# Patient Record
Sex: FEMALE | Race: BLACK OR AFRICAN AMERICAN | NOT HISPANIC OR LATINO | Employment: FULL TIME | URBAN - METROPOLITAN AREA
[De-identification: names, ages, dates, MRNs, and addresses within clinical notes are randomized per-mention and may not be internally consistent; named-entity substitution may affect disease eponyms.]

---

## 2021-01-30 ENCOUNTER — OCCMED (OUTPATIENT)
Dept: URGENT CARE | Facility: CLINIC | Age: 41
End: 2021-01-30

## 2021-01-30 DIAGNOSIS — Z02.1 PRE-EMPLOYMENT HEALTH SCREENING EXAMINATION: Primary | ICD-10-CM

## 2021-01-30 PROCEDURE — 86765 RUBEOLA ANTIBODY: CPT

## 2021-01-30 PROCEDURE — 86480 TB TEST CELL IMMUN MEASURE: CPT

## 2021-01-30 PROCEDURE — 86735 MUMPS ANTIBODY: CPT

## 2021-01-30 PROCEDURE — 86787 VARICELLA-ZOSTER ANTIBODY: CPT

## 2021-01-30 PROCEDURE — 86762 RUBELLA ANTIBODY: CPT

## 2021-01-31 LAB — RUBV IGG SERPL IA-ACNC: 137.4 IU/ML

## 2021-02-02 LAB — MEV IGG SER QL: NORMAL

## 2021-02-03 LAB
MUV IGG SER QL: NORMAL
VZV IGG SER IA-ACNC: NORMAL

## 2021-02-04 LAB
GAMMA INTERFERON BACKGROUND BLD IA-ACNC: 0.03 IU/ML
M TB IFN-G BLD-IMP: NEGATIVE
M TB IFN-G CD4+ BCKGRND COR BLD-ACNC: -0.01 IU/ML
M TB IFN-G CD4+ BCKGRND COR BLD-ACNC: 0 IU/ML
MITOGEN IGNF BCKGRD COR BLD-ACNC: 5.6 IU/ML

## 2021-11-04 ENCOUNTER — APPOINTMENT (OUTPATIENT)
Dept: URGENT CARE | Facility: CLINIC | Age: 41
End: 2021-11-04

## 2021-11-04 DIAGNOSIS — Z02.1 PRE-EMPLOYMENT HEALTH SCREENING EXAMINATION: Primary | ICD-10-CM

## 2021-11-04 PROCEDURE — 86480 TB TEST CELL IMMUN MEASURE: CPT

## 2021-11-08 LAB
GAMMA INTERFERON BACKGROUND BLD IA-ACNC: 0.03 IU/ML
M TB IFN-G BLD-IMP: NEGATIVE
M TB IFN-G CD4+ BCKGRND COR BLD-ACNC: 0.01 IU/ML
M TB IFN-G CD4+ BCKGRND COR BLD-ACNC: 0.01 IU/ML
MITOGEN IGNF BCKGRD COR BLD-ACNC: >10 IU/ML

## 2023-03-14 ENCOUNTER — TELEPHONE (OUTPATIENT)
Dept: OBGYN CLINIC | Facility: HOSPITAL | Age: 43
End: 2023-03-14

## 2023-03-14 NOTE — TELEPHONE ENCOUNTER
Caller: Patient    Doctor:     Reason for call: Patient is calling because she wants to schedule an appt for her right shoulder   She had two previous surgeries within the last 7 years/ She is having her surgical records sent over for review     Call back#: [phone]

## 2023-03-21 NOTE — TELEPHONE ENCOUNTER
Caller: Eber Castle     Doctor: Stephani Choe     Reason for call: Patient is looking for a second opinion on her right shoulder  Records from previous doctors have scanned in for your review  The patient would like to make an appointment  This is Workers Comp  Please advise the patient      Call [phone]

## 2023-03-22 NOTE — TELEPHONE ENCOUNTER
LVM for the patient to call back to schedule  I was going to schedule with Dr Elsa Boucher per Dr Muna Asencio message below

## 2023-03-30 ENCOUNTER — OFFICE VISIT (OUTPATIENT)
Dept: OBGYN CLINIC | Facility: CLINIC | Age: 43
End: 2023-03-30

## 2023-03-30 VITALS
HEART RATE: 125 BPM | WEIGHT: 133.2 LBS | BODY MASS INDEX: 24.51 KG/M2 | SYSTOLIC BLOOD PRESSURE: 143 MMHG | DIASTOLIC BLOOD PRESSURE: 91 MMHG | HEIGHT: 62 IN

## 2023-03-30 DIAGNOSIS — M25.511 ACUTE PAIN OF RIGHT SHOULDER: Primary | ICD-10-CM

## 2023-03-30 RX ORDER — NAPROXEN 500 MG/1
1 TABLET ORAL 2 TIMES DAILY PRN
COMMUNITY
Start: 2023-03-16

## 2023-03-30 RX ORDER — MULTIVITAMIN WITH IRON
500 TABLET ORAL DAILY
COMMUNITY

## 2023-03-30 RX ORDER — MELOXICAM 7.5 MG/1
7.5 TABLET ORAL 2 TIMES DAILY PRN
COMMUNITY

## 2023-03-30 RX ORDER — ACETAMINOPHEN 500 MG
1000 TABLET ORAL 3 TIMES DAILY
COMMUNITY

## 2023-03-30 RX ORDER — IBUPROFEN 800 MG/1
800 TABLET ORAL EVERY 6 HOURS PRN
COMMUNITY
Start: 2023-02-15

## 2023-03-30 RX ORDER — VENLAFAXINE HYDROCHLORIDE 37.5 MG/1
37.5 CAPSULE, EXTENDED RELEASE ORAL DAILY
COMMUNITY
Start: 2023-03-16

## 2023-03-30 RX ORDER — TRAMADOL HYDROCHLORIDE 50 MG/1
1 TABLET ORAL 2 TIMES DAILY PRN
COMMUNITY
Start: 2023-02-28

## 2023-03-30 NOTE — PROGRESS NOTES
Ortho Sports Medicine Shoulder New Patient Visit     Assesment:   36 y o  female right shoulder suspected rotator cuff tear     History of prior right shoulder arthroscopic subacromial decompression, debridement, and open subpectoral biceps tenodesis  History of prior right shoulder arthroscopic rotator cuff debridement for high-grade partial-thickness tear of the supraspinatus without visualized full-thickness component    Plan:  Rona Srinivasan presents today with right shoulder pain after recent work-related injury in which she felt a pop in the setting of several prior arthroscopic surgeries to her right shoulder  Over the last 4 weeks, she also endorses redness and some opening up of her lateral wound from the arthroscopic surgery that was previously well-healed without any erythema or discoloration  She denies any fever chills or symptoms concerning for infection  Her primary complaints are pain and weakness that began after this work-related injury during which she felt a pop  For these reasons as well as significant weakness on physical exam today concerning for full-thickness rotator cuff tear, I suggest MRI of the right shoulder without contrast to evaluate the status of her rotator cuff  I explained that if rotator cuff tear is found and surgery is recommended, I may have one of my partners perform ultrasound-guided aspiration of her shoulder joint to rule out any infection given the abnormal appearance of her lateral incision  She will follow-up after MRI for additional treatment planning    Conservative treatment:  · Susan's right shoulder pain is concerning for rotator cuff tear  She has a history of prior right shoulder surgeries that included biceps tenodesis in 2019 and a rotator cuff debridement in 2020  Following the surgeries she was relatively pain-free until the recent injury in February 2023    On physical examination today she demonstrated reduced range of motion and weakness of the rotator cuff musculature  The pain is remained persistent since February without improvements  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  Due to these factors, I recommend that she have an MRI of the right shoulder obtained in order to investigate for possible rotator cuff tear and other internal derangement of the shoulder  · Discussed with patient that due to the inflamed appearance of the superior lateral portal incision from 2019, I may want to obtain a sample of her joint fluid in order to ensure that she does not have an active infection in the joint  If she has an active infection prior to surgery, she risks  developing infection of installed hardware  · Provided patient with a note today stating that she is to remain out of work until the MRI results are reviewed at her next appointment  · She can take Tylenol and/or NSAIDs as needed for pain management  · She can try topical Voltaren gel for pain management as well  · I provided her with topical antibiotic today that she can apply over the superior lateral portal incision that has been inflamed recently  · She can follow-up once the MRI results are obtained  Imaging: All imaging from today was reviewed by myself and explained to the patient  Injection:    No Injection planned at this time  Surgery:     No surgery is recommended at this point, continue with conservative treatment plan as noted  Follow up:    No follow-ups on file  Chief Complaint   Patient presents with   • Right Shoulder - Pain     Pain 3 out of 10       History of Present Illness: The patient is a 36 y o , right hand dominant female 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, referred to me by themself, seen in clinic for consultation of right shoulder pain  She states that the recent shoulder pain has been present since February when she felt a pop her shoulder while lifting a patient    Since this injury she has had consistent pain of the right shoulder  The pain is located on the anterior and lateral aspect of her shoulder  She rates the pain as moderate to severe  She has been on light duty at work unsuccessfully  She is frustrated with the recurrent shoulder injury that she has been experiencing  Shoulder pain drastically impairs her ability to perform her daily duties at work  She is concerned that she will have to choose a new occupation if the shoulder pain does not resolve  She has a history of 2 prior right shoulder surgeries  In 2019 she had a biceps tenodesis, 2020 she had rotator cuff debridement  Both surgeries occurred in [place]  She also notes that one of her prior incisions from surgery in 2019 has been concerning lately  Her superior lateral portal has been erythematous, swollen, inflamed  She denies fever, chills, nausea, vomiting, shortness of breath, discharge or drainage from incision  She denies numbness and tingling of right upper extremity  Shoulder Surgical History:  Right shoulder biceps tenodesis in 2019 and rotator cuff debridement in 2020, both in [place] with out of network surgeon     Past Medical, Social and Family History:  History reviewed  No pertinent past medical history    Past Surgical History:   Procedure Laterality Date   • FL INJECTION RIGHT SHOULDER (ARTHROGRAM) Right 2019 2020     No Known Allergies  Current Outpatient Medications on File Prior to Visit   Medication Sig Dispense Refill   • acetaminophen (TYLENOL) 500 mg tablet Take 1,000 mg by mouth 3 (three) times a day     • Cholecalciferol 25 MCG (1000 UT) tablet Take 2,000 Units by mouth in the morning     • ibuprofen (MOTRIN) 800 mg tablet Take 800 mg by mouth every 6 (six) hours as needed     • Magnesium 250 MG TABS Take 500 mg by mouth daily     • meloxicam (MOBIC) 7 5 mg tablet Take 7 5 mg by mouth 2 (two) times a day as needed     • naproxen (NAPROSYN) 500 mg tablet Take 1 tablet by mouth 2 (two) times a day as "needed     • traMADol (ULTRAM) 50 mg tablet Take 1 tablet by mouth 2 (two) times a day as needed     • venlafaxine (EFFEXOR-XR) 37 5 mg 24 hr capsule Take 37 5 mg by mouth daily       No current facility-administered medications on file prior to visit  Social History     Socioeconomic History   • Marital status: Unknown     Spouse name: Not on file   • Number of children: Not on file   • Years of education: Not on file   • Highest education level: Not on file   Occupational History   • Not on file   Tobacco Use   • Smoking status: Never   • Smokeless tobacco: Never   Substance and Sexual Activity   • Alcohol use: Never   • Drug use: Never   • Sexual activity: Not on file   Other Topics Concern   • Not on file   Social History Narrative   • Not on file     Social Determinants of Health     Financial Resource Strain: Not on file   Food Insecurity: Not on file   Transportation Needs: Not on file   Physical Activity: Not on file   Stress: Not on file   Social Connections: Not on file   Intimate Partner Violence: Not on file   Housing Stability: Not on file         I have reviewed the past medical, surgical, social and family history, medications and allergies as documented in the EMR  Review of systems: ROS is negative other than that noted in the HPI  Constitutional: Negative for fatigue and fever  HENT: Negative for sore throat  Respiratory: Negative for shortness of breath  Cardiovascular: Negative for chest pain  Gastrointestinal: Negative for abdominal pain  Endocrine: Negative for cold intolerance and heat intolerance  Genitourinary: Negative for flank pain  Musculoskeletal: Negative for back pain  Skin: Negative for rash  Allergic/Immunologic: Negative for immunocompromised state  Neurological: Negative for dizziness  Psychiatric/Behavioral: Negative for agitation        Physical Exam:    Blood pressure 143/91, pulse (!) 125, height 5' 2\" (1 575 m), weight 60 4 kg (133 lb 3 2 " oz)     General/Constitutional: NAD, well developed, well nourished  HENT: Normocephalic, atraumatic  CV: Intact distal pulses, regular rate  Resp: No respiratory distress or labored breathing  Lymphatic: No lymphadenopathy palpated  Neuro: Alert and Oriented x 3, no focal deficits  Psych: Normal mood, normal affect, normal judgement, normal behavior  Skin: Warm, dry, no rashes, no erythema      Shoulder focused exam:     Visual inspection of the right shoulder demonstrates normal contour without atrophy  No evidence of scapular dyskinesia or atrophy  No scapular winging  Incisions from prior surgeries demonstrate routine healing with exception of lateral superior portal incision which demonstrates mild erythema and swelling     Active and passive range of motion demonstrates forward flexion to 130, abduction to 80, extension of 15, external rotation is 30 with the arm the side, internal rotation to spinous process of T12   Rotator cuff strength is 4/5 to resisted forward flexion, 4/5 resisted abduction, 4/5 resisted external rotation, 5/5 resisted internal rotation  No tenderness to palpation at the distal clavicle, AC joint, acromion, or scapular spine  No pain with cross-body adduction  Negative Neer's test, negative modified Waddell impingement test  Negative external rotation lag sign  Negative belly press, negative lift-off  Negative speed's and Yergason's test  Negative tenderness to palpation at the bicipital groove  Negative [place]'s test        UE NV Exam: +2 Radial pulses bilaterally  Sensation intact to light touch C5-T1 bilaterally, Radial/median/ulnar nerve motor intact      Bilateral elbow, wrist, and and forearm ROM full, painless with passive ROM, no ttp or crepitance throughout extremities below shoulder joint    Cervical ROM is full without pain, numbness or tingling      Shoulder Imaging  MRI report of the right shoulder from August 2020 was reviewed which demonstrates a high-grade partial-thickness tear of the supraspinatus with possible full-thickness component with dye extravasation from the glenohumeral joint and subacromial space  This was the radiologist report and I do not have access to the actual images        Scribe Attestation    I,:   am acting as a scribe while in the presence of the attending physician :       I,:   personally performed the services described in this documentation    as scribed in my presence :

## 2023-03-30 NOTE — LETTER
March 30, 2023     Patient: Isa Robertson  YOB: 1980  Date of Visit: 3/30/2023      To Whom it May Concern:    Isa Robertson is under my professional care  Dallin Bao was seen in my office on 3/30/2023  Dallinjoss Gore is to remain out of work until the results of her right shoulder MRI return  She will be reevaluated at her follow-up appointments  If you have any questions or concerns, please don't hesitate to call           Sincerely,          Rosa M Maki, DO        CC: No Recipients

## 2023-03-31 ENCOUNTER — OFFICE VISIT (OUTPATIENT)
Dept: URGENT CARE | Facility: CLINIC | Age: 43
End: 2023-03-31

## 2023-03-31 VITALS
DIASTOLIC BLOOD PRESSURE: 70 MMHG | RESPIRATION RATE: 16 BRPM | OXYGEN SATURATION: 99 % | SYSTOLIC BLOOD PRESSURE: 115 MMHG | TEMPERATURE: 100.1 F | HEART RATE: 135 BPM

## 2023-03-31 DIAGNOSIS — J02.0 STREP PHARYNGITIS: Primary | ICD-10-CM

## 2023-03-31 DIAGNOSIS — J02.9 SORE THROAT: ICD-10-CM

## 2023-03-31 LAB — S PYO AG THROAT QL: NEGATIVE

## 2023-03-31 RX ORDER — AMOXICILLIN 500 MG/1
500 CAPSULE ORAL EVERY 12 HOURS SCHEDULED
Qty: 20 CAPSULE | Refills: 0 | Status: SHIPPED | OUTPATIENT
Start: 2023-03-31 | End: 2023-04-10

## 2023-03-31 NOTE — PROGRESS NOTES
[facility]        NAME: Theresa Mo is a 36 y o  female  : 1980    MRN: [id number]  DATE: 2023  TIME: 4:36 PM    Assessment and Plan   Strep pharyngitis [J02 0]  1  Strep pharyngitis  amoxicillin (AMOXIL) 500 mg capsule      2  Sore throat  POCT rapid strepA    Throat culture            Patient Instructions       Follow up with PCP in 3-5 days  Proceed to  ER if symptoms worsen  Chief Complaint     Chief Complaint   Patient presents with   • Sore Throat     Pt reports sore throat, fever, chills since last night  History of Present Illness       59-year-old female reports beginning with a sore throat and painful swallowing since yesterday  She is also reporting fevers and difficulty swallowing  Review of Systems   Review of Systems   Constitutional: Negative  HENT: Positive for sore throat and trouble swallowing  Eyes: Negative  Respiratory: Negative  Cardiovascular: Negative  Gastrointestinal: Negative  Genitourinary: Negative  Skin: Negative  Allergic/Immunologic: Negative  Neurological: Negative  Hematological: Negative  Psychiatric/Behavioral: Negative            Current Medications       Current Outpatient Medications:   •  amoxicillin (AMOXIL) 500 mg capsule, Take 1 capsule (500 mg total) by mouth every 12 (twelve) hours for 10 days, Disp: 20 capsule, Rfl: 0  •  acetaminophen (TYLENOL) 500 mg tablet, Take 1,000 mg by mouth 3 (three) times a day, Disp: , Rfl:   •  Cholecalciferol 25 MCG (1000 UT) tablet, Take 2,000 Units by mouth in the morning, Disp: , Rfl:   •  ibuprofen (MOTRIN) 800 mg tablet, Take 800 mg by mouth every 6 (six) hours as needed, Disp: , Rfl:   •  Magnesium 250 MG TABS, Take 500 mg by mouth daily, Disp: , Rfl:   •  meloxicam (MOBIC) 7 5 mg tablet, Take 7 5 mg by mouth 2 (two) times a day as needed, Disp: , Rfl:   •  naproxen (NAPROSYN) 500 mg tablet, Take 1 tablet by mouth 2 (two) times a day as needed, Disp: , Rfl:   •  traMADol (ULTRAM) 50 mg tablet, Take 1 tablet by mouth 2 (two) times a day as needed, Disp: , Rfl:   •  venlafaxine (EFFEXOR-XR) 37 5 mg 24 hr capsule, Take 37 5 mg by mouth daily, Disp: , Rfl:     Current Allergies     Allergies as of 03/31/2023   • (No Known Allergies)            The following portions of the patient's history were reviewed and updated as appropriate: allergies, current medications, past family history, past medical history, past social history, past surgical history and problem list      History reviewed  No pertinent past medical history  Past Surgical History:   Procedure Laterality Date   • FL INJECTION RIGHT SHOULDER (ARTHROGRAM) Right 2019 2020       No family history on file  Medications have been verified  Objective   /70   Pulse (!) 135   Temp 100 1 °F (37 8 °C)   Resp 16   SpO2 99%   No LMP recorded  Physical Exam     Physical Exam  Vitals and nursing note reviewed  Constitutional:       Appearance: She is well-developed  HENT:      Head: Normocephalic  Mouth/Throat:      Pharynx: Pharyngeal swelling, oropharyngeal exudate and posterior oropharyngeal erythema present  Tonsils: Tonsillar exudate present  Eyes:      Pupils: Pupils are equal, round, and reactive to light  Cardiovascular:      Rate and Rhythm: Regular rhythm  Tachycardia present  Pulmonary:      Effort: Pulmonary effort is normal    Musculoskeletal:         General: Normal range of motion  Skin:     General: Skin is warm and dry  Neurological:      Mental Status: She is alert and oriented to person, place, and time

## 2023-04-01 LAB — BACTERIA THROAT CULT: ABNORMAL

## 2023-04-06 ENCOUNTER — TELEPHONE (OUTPATIENT)
Dept: OBGYN CLINIC | Facility: HOSPITAL | Age: 43
End: 2023-04-06

## 2023-04-06 NOTE — TELEPHONE ENCOUNTER
Caller: Daren    Doctor: geovanna    Reason for call: Calling to confirm patient scheduled an appt with ortho    Call back#: na

## 2023-04-25 ENCOUNTER — TELEPHONE (OUTPATIENT)
Dept: OBGYN CLINIC | Facility: MEDICAL CENTER | Age: 43
End: 2023-04-25

## 2023-04-25 DIAGNOSIS — M54.12 RADICULOPATHY, CERVICAL REGION: ICD-10-CM

## 2023-04-25 DIAGNOSIS — G90.511 COMPLEX REGIONAL PAIN SYNDROME TYPE 1 OF RIGHT UPPER EXTREMITY: Primary | ICD-10-CM

## 2023-04-25 NOTE — TELEPHONE ENCOUNTER
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

## 2023-04-25 NOTE — TELEPHONE ENCOUNTER
Referral to spine and pain placed in chart    I will have someone reach out to [facility] to reach out patient

## 2023-04-25 NOTE — TELEPHONE ENCOUNTER
Caller: Patient    Doctor: Abimbola Monroy    Reason for call: Patient would like to disregard her request in the previous message  WC is being difficult regarding the same

## 2023-04-26 ENCOUNTER — TELEPHONE (OUTPATIENT)
Dept: OBGYN CLINIC | Facility: HOSPITAL | Age: 43
End: 2023-04-26

## 2023-04-26 NOTE — TELEPHONE ENCOUNTER
Caller: Patient    Doctor: Dr Dayron Mead    Reason for call: Patient calling back now stating that her employer is now asking for the order for pain management to be faxed to the number below  Patient stating that her employer doesn't feel that patient needs to see Thee Coleman  Fax: [phone]      Call back#: [phone]

## 2023-04-26 NOTE — TELEPHONE ENCOUNTER
Caller: Mayte    Doctor/Office: Filipe    CB#:       What needs to be faxed: 4/18/23 PT order    ATTN to: Lizzeth Wong    Fax#: [phone]      Documents were successfully e-faxed

## 2023-04-26 NOTE — TELEPHONE ENCOUNTER
Patient     Doctor: Dr Mago Julian     Reason for call: Patient calling back now stating that her employer is now asking for another order for pain management : Per the patient the pain management referral will need to be made out to [facility], please  And then faxed to the woman below   Thank you      Attn: Zuly Renae  Fax: [phone]

## 2023-04-27 NOTE — TELEPHONE ENCOUNTER
Caller: Patient    Doctor: Ozzy Delong    Reason for call: Wanted to confirm referral was faxed to [facility] [place]   Advised per note-yes    Call back#: [phone]

## 2023-04-28 ENCOUNTER — TELEPHONE (OUTPATIENT)
Dept: OBGYN CLINIC | Facility: CLINIC | Age: 43
End: 2023-04-28

## 2023-04-28 NOTE — TELEPHONE ENCOUNTER
Wandy Peres from [facility] faxed request for PT script to be faxed to [phone]  Faxed script received confirmation

## 2023-05-04 ENCOUNTER — TELEPHONE (OUTPATIENT)
Dept: OBGYN CLINIC | Facility: CLINIC | Age: 43
End: 2023-05-04

## 2023-05-04 ENCOUNTER — TELEPHONE (OUTPATIENT)
Dept: OBGYN CLINIC | Facility: HOSPITAL | Age: 43
End: 2023-05-04

## 2023-05-04 NOTE — TELEPHONE ENCOUNTER
"Pt says that the Pain Management referral must be specifically made out to \"[facility]  \" Are you able to redo the referral like that? She is contracted with them through Workers Comp     "

## 2023-05-04 NOTE — TELEPHONE ENCOUNTER
Caller: Patient     Doctor: Elenita Claudio     Reason for call: Called to confirmed the [place]   She also asked us to refax it for her to [phone]

## 2023-05-05 DIAGNOSIS — G90.511 COMPLEX REGIONAL PAIN SYNDROME TYPE 1 OF RIGHT UPPER EXTREMITY: Primary | ICD-10-CM

## 2023-05-05 NOTE — PROGRESS NOTES
Referral to pain management for [facility]  With Dr Sarah Dominguez or Dr Leatha Saeed  Complex regional pain syndrome referring pain to right upper extremity  Worker's Comp

## 2023-05-08 ENCOUNTER — TELEPHONE (OUTPATIENT)
Dept: OBGYN CLINIC | Facility: CLINIC | Age: 43
End: 2023-05-08

## 2023-05-08 NOTE — TELEPHONE ENCOUNTER
Left msg that I sent the new referral to [facility]  Also have to reschedule [facility] appt from Dr Van Raymundo  Pt is now scheduled 6/13/23 at 11:30 AM at [facility] with Dr Jelani Morales  I asked her to call back and confirm appt or change if needed but [facility]

## 2023-06-01 ENCOUNTER — TELEPHONE (OUTPATIENT)
Dept: OBGYN CLINIC | Facility: HOSPITAL | Age: 43
End: 2023-06-01

## 2023-06-01 NOTE — TELEPHONE ENCOUNTER
Caller: Patient    Doctor: Annika Blancas      Reason for call: Patient called stating she fell recently on the right shoulder  Patient states pain management recommended for her to get an x-ray  Patient would like to know if you agree with this?    Patient feels it is something that can not be seen on an x-ray   Patient is scheduled to see you on 6/6/23    Call back#:

## 2023-06-02 NOTE — TELEPHONE ENCOUNTER
I spoke to Essence and she has an order for Rt shoulder xray  Her question was if it was really necessary to get an Xray because she has an MRI   I advised her to get the xray so Dr Maura Duvall has both studies  Advised to bring a disc with images if she goes somewhere other than [facility]

## 2023-06-12 NOTE — PROGRESS NOTES
Chief Complaint: Shoulder pain    HPI:    Antonina Finley is a 36y o  year old female  who presents today for new evaluation and treatment of shoulder pain    Description of symptoms:     Seen by Dr Socorro Alvarado who referred the patient for trial of subacromial bursa aspiration  Today's visit: Patient states the skin color change has been recent and is regressing in size and pigmentation  Denies any family history of skin cancer  Patient did states she recently had a fall for which she was seen at [facility] and x-rays were obtained and normal     Patient states she has been compliant with oral NSAIDs  I have personally reviewed pertinent films in PACS  Patient Active Problem List   Diagnosis   • Strep pharyngitis        Current Outpatient Medications on File Prior to Visit   Medication Sig Dispense Refill   • acetaminophen (TYLENOL) 500 mg tablet Take 1,000 mg by mouth 3 (three) times a day     • Cholecalciferol 25 MCG (1000 UT) tablet Take 2,000 Units by mouth in the morning     • ibuprofen (MOTRIN) 800 mg tablet Take 800 mg by mouth every 6 (six) hours as needed     • Magnesium 250 MG TABS Take 500 mg by mouth daily     • meloxicam (MOBIC) 15 mg tablet TAKE 1/2 TO 1 TABLETS BY MOUTH DAILY AS NEEDED DISCONTINUE OTHER NSAIDS, TAKE WITH FOOD  • naproxen (NAPROSYN) 500 mg tablet Take 1 tablet by mouth 2 (two) times a day as needed     • traMADol (ULTRAM) 50 mg tablet Take 1 tablet by mouth 2 (two) times a day as needed     • venlafaxine (EFFEXOR-XR) 37 5 mg 24 hr capsule Take 37 5 mg by mouth daily     • meloxicam (MOBIC) 7 5 mg tablet Take 7 5 mg by mouth 2 (two) times a day as needed (Patient not taking: Reported on 6/13/2023)       No current facility-administered medications on file prior to visit          No Known Allergies     Tobacco Use: Low Risk  (6/13/2023)    Patient History    • Smoking Tobacco Use: Never    • Smokeless Tobacco Use: Never    • Passive Exposure: Not on file        Social Determinants of Health     Tobacco Use: Low Risk  (6/13/2023)    Patient History    • Smoking Tobacco Use: Never    • Smokeless Tobacco Use: Never    • Passive Exposure: Not on file   Alcohol Use: Not on file   Financial Resource Strain: Not on file   Food Insecurity: Not on file   Transportation Needs: Not on file   Physical Activity: Not on file   Stress: Not on file   Social Connections: Not on file   Intimate Partner Violence: Not on file   Depression: Not at risk (3/31/2023)    PHQ-2    • PHQ-2 Score: 0   Housing Stability: Not on file               Review of Systems     Body mass index is 23 96 kg/m²  Physical Exam  Vitals and nursing note reviewed  Constitutional:       Appearance: Normal appearance  HENT:      Head: Atraumatic  Eyes:      Conjunctiva/sclera: Conjunctivae normal    Pulmonary:      Effort: Pulmonary effort is normal    Neurological:      Mental Status: She is oriented to person, place, and time  Psychiatric:         Mood and Affect: Mood normal          Behavior: Behavior normal           Ortho Exam:    Body part: right shoulder    Inspection: no gross deformity, no erythema, no warmth, no effusion   See below for skin change      Distal Neurovascular Status: Intact, Yes     Large joint arthrocentesis: R subacromial bursa  Universal Protocol:  Consent: Verbal consent obtained  Risks and benefits: risks, benefits and alternatives were discussed  Consent given by: patient  Patient understanding: patient states understanding of the procedure being performed  Patient consent: the patient's understanding of the procedure matches consent given  Site marked: the operative site was marked  Radiology Images displayed and confirmed   If images not available, report reviewed: imaging studies available  Required items: required blood products, implants, devices, and special equipment available  Patient identity confirmed: verbally with patient    Supporting Documentation  Indications: pain "  Procedure Details  Location: shoulder - R subacromial bursa  Preparation: Patient was prepped and draped in the usual sterile fashion  Needle size: 22 G (3-1/2 inch)  Ultrasound guidance: yes  Approach: lateral    Aspirate amount: 0 mL  Analysis: fluid sample sent for laboratory analysis    Patient tolerance: patient tolerated the procedure well with no immediate complications  Dressing:  Sterile dressing applied             Assessment:     Diagnosis ICD-10-CM Associated Orders   1  Change of skin color  R23 8 Ambulatory Referral to Dermatology      2  Acute pain of right shoulder  M25 511 Ambulatory Referral to Sports Medicine     Body fluid culture and Gram stain      3  Hx of shoulder surgery  Z98 [id number] Body fluid culture and Gram stain           Plan:    We discussed point-of-care ultrasound findings of subacromial bursa with Janett Granger  Point-of-care ultrasound finding minimal fluid of the subacromial bursa  No significant fluid within the glenohumeral joint  Reviewed benefits and risks of subacromial bursa aspiration attempt  Agreed to proceed with aspiration attempt  Patient tolerated procedure well  No fluid aspirated  Trial to send fluid remaining within needle of procedure, out for Gram stain and culture  Reviewed with patient that these results may be negative due to the fact that no aspiration fluid was collected  As for the skin change of color, we instructed patient to follow-up with dermatology  As skin shave /biopsy may be beneficial   Patient denies any recent corticosteroid injection to the right shoulder  Denies any recent subcutaneous/intramuscular injection to the right shoulder  Shared decision making, patient agreeable to plan  Follow-Up:    Return for Follow up with Previous Orthopedic as discussed  Portions of the record may have been created with voice recognition software   Occasional wrong word or \"sound alike\" substitutions may have occurred due to " the inherent limitations of voice recognition software  Please review the chart carefully and recognize, using context, where substitutions/typographical errors may have occurred

## 2023-06-13 ENCOUNTER — OFFICE VISIT (OUTPATIENT)
Dept: OBGYN CLINIC | Facility: OTHER | Age: 43
End: 2023-06-13

## 2023-06-13 VITALS
HEIGHT: 62 IN | BODY MASS INDEX: 24.11 KG/M2 | SYSTOLIC BLOOD PRESSURE: 113 MMHG | DIASTOLIC BLOOD PRESSURE: 76 MMHG | HEART RATE: 120 BPM | WEIGHT: 131 LBS

## 2023-06-13 DIAGNOSIS — Z98.890 HX OF SHOULDER SURGERY: ICD-10-CM

## 2023-06-13 DIAGNOSIS — M25.511 ACUTE PAIN OF RIGHT SHOULDER: ICD-10-CM

## 2023-06-13 DIAGNOSIS — R23.8 CHANGE OF SKIN COLOR: Primary | ICD-10-CM

## 2023-06-13 PROCEDURE — 87070 CULTURE OTHR SPECIMN AEROBIC: CPT | Performed by: FAMILY MEDICINE

## 2023-06-13 PROCEDURE — 87205 SMEAR GRAM STAIN: CPT | Performed by: FAMILY MEDICINE

## 2023-06-13 RX ORDER — MELOXICAM 15 MG/1
TABLET ORAL
COMMUNITY
Start: 2023-05-23

## 2023-06-14 ENCOUNTER — LAB REQUISITION (OUTPATIENT)
Dept: LAB | Facility: HOSPITAL | Age: 43
End: 2023-06-14
Payer: COMMERCIAL

## 2023-06-14 DIAGNOSIS — M25.511 PAIN IN RIGHT SHOULDER: ICD-10-CM

## 2023-06-15 ENCOUNTER — TELEPHONE (OUTPATIENT)
Dept: OBGYN CLINIC | Facility: HOSPITAL | Age: 43
End: 2023-06-15

## 2023-06-15 NOTE — TELEPHONE ENCOUNTER
Caller: Regis Smith    Doctor: Ozzy Walker    Reason for call: Yolanda Coleman is calling to see if patient was seen yesterday with dr Kj Kyle   Advised no, 6/13 with dr Buzz Rizzo    Call back#: n/a

## 2023-06-17 LAB
BACTERIA SPEC BFLD CULT: ABNORMAL
GRAM STN SPEC: ABNORMAL

## 2023-06-20 ENCOUNTER — TELEPHONE (OUTPATIENT)
Dept: OBGYN CLINIC | Facility: OTHER | Age: 43
End: 2023-06-20

## 2023-06-20 ENCOUNTER — TELEPHONE (OUTPATIENT)
Dept: OBGYN CLINIC | Facility: HOSPITAL | Age: 43
End: 2023-06-20

## 2023-06-20 NOTE — TELEPHONE ENCOUNTER
Caller: Lara Dover     Doctor: Jesika Petersen / Mally Logan    Reason for call: Patient called in to confirm if her  R shoulder aspiration results were in her chart  Patient relayed the results have come in to her chart  Patient would like to discuss results of aspiration and on- going shoulder pain  First available appointment is 07-31  Patient requesting call regarding results and to inquire if she could be seen sooner       Please advise         Call back#: [phone]

## 2023-06-20 NOTE — TELEPHONE ENCOUNTER
Call patient to review subacromial bursa aspiration results  Reviewed recent blood fluid culture results  Positive results were Staphylococcus coagulase negative which is a normal skin tadeo  At this time body fluid culture results are most likely a skin containment, as no fluid was obtained from bursa  The gram stain results were negative for polys or bacteria  Discussed C  Acnes could not be r/o as no aspiration was collected from bursa under ultrasound guidance  May follow up with orthopedic surgeon as previously discussed on 04/18/2023 visit  Patient understanding and agreeable to plan

## 2023-06-22 ENCOUNTER — EVALUATION (OUTPATIENT)
Dept: PHYSICAL THERAPY | Facility: CLINIC | Age: 43
End: 2023-06-22
Payer: OTHER MISCELLANEOUS

## 2023-06-22 ENCOUNTER — TELEPHONE (OUTPATIENT)
Dept: OBGYN CLINIC | Facility: HOSPITAL | Age: 43
End: 2023-06-22

## 2023-06-22 DIAGNOSIS — M25.511 ACUTE PAIN OF RIGHT SHOULDER: Primary | ICD-10-CM

## 2023-06-22 PROCEDURE — 97161 PT EVAL LOW COMPLEX 20 MIN: CPT | Performed by: PHYSICAL THERAPIST

## 2023-06-22 PROCEDURE — 97112 NEUROMUSCULAR REEDUCATION: CPT | Performed by: PHYSICAL THERAPIST

## 2023-06-22 NOTE — PROGRESS NOTES
PT Evaluation     Today's date: 2023  Patient name: Leticia Birmingham  : 1980  MRN: [id number]  Referring provider: Ion Salas DO  Dx:   Encounter Diagnosis     ICD-10-CM    1  Acute pain of right shoulder  M25 511 Ambulatory referral to Physical Therapy                     Assessment  Assessment details: Leticia Birmingham is a 36 y o  female presenting to outpatient physical therapy at [place] [address] with complaints of R shoulder pain   They present with decreased range of motion, decreased strength, limited flexibility, poor postural awareness, poor body mechanics, poor balance, decreased tolerance to activity and decreased functional mobility due to Acute pain of right shoulder  (primary encounter diagnosis)  Tiffany Bal would benefit from skilled physical therapy to address noted impairments in order to allow for full functional return to work and ADL-related activities  Thank you for the referral!  Impairments: abnormal muscle firing, abnormal or restricted ROM, activity intolerance, impaired balance, impaired physical strength, lacks appropriate home exercise program, pain with function and poor body mechanics  Barriers to therapy: n/a  Understanding of Dx/Px/POC: excellent  Goals  ST   Independent with HEP in 2 weeks  2  Decrease pain by 50% in 3 wks  3   Increase R shoulder flexion AROM to 150 degrees in 3 weeks     LT  Achieve FOTO score of 57/100 in 6 weeks   2   Able to achieve full R shoulder ext in 6 weeks  3  Strength = 4/5 R shoulder flex in 6 weeks      Plan  Plan details: RE in 6 weeks    Patient would benefit from: skilled physical therapy  Planned modality interventions: cryotherapy, electrical stimulation/Russian stimulation and thermotherapy: hydrocollator packs  Planned therapy interventions: abdominal trunk stabilization, manual therapy, neuromuscular re-education, therapeutic activities, therapeutic exercise, body mechanics training and home exercise "program  Frequency: 2x week  Duration in visits: 12  Duration in weeks: 6  Plan of Care beginning date: 6/22/2023  Plan of Care expiration date: 8/4/2023  Treatment plan discussed with: patient        Subjective Evaluation    History of Present Illness  Mechanism of injury: Pt c/o right shoulder pain  She has a h/o biceps tenodesis in January 2019 from a biceps tear while exercising  By May 2019, she needed a manipulation under anesthesia secondary to frozen shoulder  She continued to have pain and eventually had a rotator cuff debridement in August 2020  She moved to the area in October of 2020 and began seeing a different pain doctor  This provided significant relief of symptoms with tramadol and meloxicam for 18 months  In February 2023, 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 - and noted feeling a burning pain to the bicep and lateral arm  She saw her pain doctor again and received a lidocaine injection  Seen by Dr Lala Eldridge in March 2023  She has had increased pigmentation to the anterior incision following this injury  She denies pain at that region  There was concern for internal infection, aspiration culture positive for Staphylococcus coagulase  MRI shows \"intra substance supraspinatus degeneration, partial undersurface fraying of the distal sub scapularis  These are age indeterminate  Superior bursitis  \"    Pain is located deep anterior, posterior and superior shoulder with occasional sharp throbbing to the biceps  Avg pain level rated 5/10  Agg with raising her arm, lifting a 1/2 gallon milk, reaching  Eased with rest while arm is supported, taking meloxicam and tramadol, pain patches, heat  Denies RUE N/T, neck pain, fever  Positive for R shoulder weakness  Pt is R hand dominant  She is not currently working  She lives at home with her family, has 3 older children  Goals for therapy is to get stronger, not have to take as much medication        Patient Goals  Patient goals for " therapy: decreased edema, decreased pain, improved balance, increased motion, return to work, return to [employer] activities, independence with ADLs/IADLs and increased strength          Objective     Postural Observations    Additional Postural Observation Details  Mild rounded shoulders bilat; incisions with appropriate healing; anterior incision with 0 5 in hyperpigmentation - no tenderness to touch    Tenderness     Additional Tenderness Details  TTP R pec, biceps belly, biceps tendon, anterior joint line, posterior joint line, supraspin, infra, teres, lat, triceps    (-) painful arc  (-) sulcus    Cervical/Thoracic Screen   Cervical range of motion within normal limits with the following exceptions: Pain with L SB    Neurological Testing     Sensation     Shoulder   Left Shoulder   Intact: light touch    Right Shoulder   Intact: light touch    Active Range of Motion     Right Shoulder   Flexion: 120 degrees   Extension: 25 degrees   Abduction: 57 degrees   External rotation 0°: 44 degrees     Strength/Myotome Testing     Left Shoulder     Planes of Motion   Flexion: 5   Extension: 5   External rotation at 0°: 5   Internal rotation at 0°: 5     Isolated Muscles   Biceps: 5   Triceps: 5   Upper trapezius: 5     Right Shoulder     Planes of Motion   Flexion: 4-   Extension: 4   Abduction: 4   External rotation at 0°: 4-   Internal rotation at 0°: 4     Isolated Muscles   Biceps: 4-   Triceps: 4   Upper trapezius: 5              Precautions: potential R shoulder adhesive capsulitis  Other factors: h/o R shoulder surgeries/traumas      HEP:  Access Code: [email or web address]  URL: [email or web address]  Date: 06/22/2023  Prepared by: Savanna Chang    Exercises  - Standing Isometric Shoulder Flexion with Doorway - Arm Bent  - 5 reps - 5 sec hold  - Standing Isometric Shoulder Extension with Doorway - Arm Bent  - 5 reps - 5 sec hold      Manuals 6/22            R sh' PROM             R sh' inf, AP mob "   R truman' laser                          Neuro Re-Ed             RC iso flex/ext 5\"x5 ea            RC iso er/ir             RC iso abd/add                                                                 Ther Ex             pendulums             UT stretch             cspine AROM             Elbow flex/ext             Bicep curl                                                    Ther Activity                                       Gait Training                                       Modalities             TENS+MHP                             "

## 2023-06-22 NOTE — TELEPHONE ENCOUNTER
Caller: Patient     Doctor: Jay Valerio     Reason for call: Patient was not just calling in regards to the results  She is wanting to know if she needs to have the aspiration done again because she was advised it was contaminated ? Wanting to know if she is ok to wait until July 31st or if she can be seen sooner in the office?      She would like a call back from clinical as her pain is not resolving and she did have a fall back in May     Call back#: [phone]

## 2023-06-27 NOTE — TELEPHONE ENCOUNTER
Called and spoke w/pt and spoke w/pt and she had decided to go with a different orthopedic surgeon  She feels that she is not getting anywhere with her care  States she feels like there has been a mix up in providers as she was seeing Dr Cristina Danielle and encounters keep going back to Dr Perico Guerra   Stated she did not like being told that she had already spoken to Dr CARLOTTA Duggan when she had further questions about her contaminated specimen  States she has been dealing w/this for 3 months and has discussed this with her   and has gotten a referral to another ortho provider  Apologized to pt  I did try to get back to her last week to get more information  She stated that she was frustrated that she could not get a f/u with Dr Cristina Danielle until July 31st   Offered pt an appt w/Dr Cristina Danielle this Thursday, but pt declined  She stated that she already has a referral to see someone else  Pt indicated that she was very frustrated

## 2023-06-27 NOTE — TELEPHONE ENCOUNTER
Dr Emil Delacruz, Thank you for updating me  Your note was very much appreciated   Jose Luis Rosales RN

## 2023-07-05 ENCOUNTER — OFFICE VISIT (OUTPATIENT)
Dept: PHYSICAL THERAPY | Facility: CLINIC | Age: 43
End: 2023-07-05
Payer: OTHER MISCELLANEOUS

## 2023-07-05 DIAGNOSIS — M25.511 ACUTE PAIN OF RIGHT SHOULDER: Primary | ICD-10-CM

## 2023-07-05 PROCEDURE — 97110 THERAPEUTIC EXERCISES: CPT | Performed by: PHYSICAL THERAPIST

## 2023-07-05 PROCEDURE — 97140 MANUAL THERAPY 1/> REGIONS: CPT | Performed by: PHYSICAL THERAPIST

## 2023-07-05 NOTE — PROGRESS NOTES
Daily Note     Today's date: 2023  Patient name: Stephanie Palomares  : 1980  MRN: [id number]  Referring provider: Lise Barraza DO  Dx:   Encounter Diagnosis     ICD-10-CM    1. Acute pain of right shoulder  M25.511           Start Time: 1200          Subjective: Is going to have a second opinion from a sports med doctor at [facility]. Appt has not been scheduled yet. Pain has not improved since IE. Still having the most pain in the anterior shoulder and bicep area. The HEP put her in tears with how painful it was. Objective: See treatment diary below      Assessment: Spoke with pt about chronic pain mechanics and discussed plan moving fwd. She agreed to only perform exercises as given by myself and not a mix of exercises given by previous therapists or her pain mgmt doctor in order to ensure competent care. Did not perform isometrics secondary to increased pain caused with them at home. She is painful and guarded with prom, especially abd. Added cervical and elbow/wrist exercises for general movement above and below the affected area. Ended with L arm UBE for cardiovascular exercise and pain relief. Pt noted feeling okay end of session, no increased RUE pain. Provided pt with handouts on chronic pain and CRPS. Tolerated treatment fair. Patient demonstrated fatigue post treatment and would benefit from continued PT      Plan: Continue per plan of care.       Precautions: potential R shoulder adhesive capsulitis, CRPS  Other factors: h/o R shoulder surgeries/traumas      HEP:  Chronic pain and CRPS info handouts      Manuals            Pt ed   [facility]           ALVARADO laughlin' PROM   [facility]           R sh' inf, AP mob             R sh' laser                          Neuro Re-Ed             RC iso flex/ext 5"x5 ea            RC iso er/ir             RC iso abd/add                                                                 Ther Ex             UBE  L only 3'           pendulums             UT stretch cspine AROM  x10 ea           Elbow flex/ext  x20           Wrist circles  x20 ea           Bicep curl                                                    Ther Activity                                       Gait Training                                       Modalities             TENS+MHP

## 2023-07-06 ENCOUNTER — OFFICE VISIT (OUTPATIENT)
Dept: PHYSICAL THERAPY | Facility: CLINIC | Age: 43
End: 2023-07-06
Payer: OTHER MISCELLANEOUS

## 2023-07-06 DIAGNOSIS — M25.511 ACUTE PAIN OF RIGHT SHOULDER: Primary | ICD-10-CM

## 2023-07-06 PROCEDURE — 97110 THERAPEUTIC EXERCISES: CPT | Performed by: PHYSICAL THERAPIST

## 2023-07-06 PROCEDURE — 97140 MANUAL THERAPY 1/> REGIONS: CPT | Performed by: PHYSICAL THERAPIST

## 2023-07-06 NOTE — PROGRESS NOTES
Daily Note     Today's date: 2023  Patient name: Dodie Saldaña  : 1980  MRN: [id number]  Referring provider: Macarthur Prader, DO  Dx:   Encounter Diagnosis     ICD-10-CM    1. Acute pain of right shoulder  M25.511           Start Time: 1330          Subjective: No excessive pain secondary to yesterday's visit. She would like PT to call to f/u with her pain dr in order to ensure agreement in PT plan. Objective: See treatment diary below      Assessment: Pt less hesitant with shoulder PROM today. She is most comfortable with small oscillations in the 0-20 deg range of flexion, abd and er/ir. Good tolerance to cervical, elbow and wrist exercises. Favorable to the gripper squeeze. Able to do 1 more minute on the UBE with the L arm today compared to yesterday. HEP updated. Tolerated treatment fair. Patient demonstrated fatigue post treatment and would benefit from continued PT      Plan: Continue per plan of care. Precautions: potential R shoulder adhesive capsulitis, CRPS  Other factors: h/o R shoulder surgeries/traumas      HEP:  Chronic pain and CRPS info handouts    Access Code: [email or web address]  URL: [email or web address]  Date: 2023  Prepared by: Merritt Bhandari    Exercises  - Seated Cervical Rotation AROM  - 10 reps  - Seated Cervical Extension AROM  - 10 reps  - Seated Cervical Sidebending AROM  - 10 reps  - Seated Elbow Flexion and Extension AROM  - 20 reps  - Wrist Circles AROM  - 10 reps      Manuals           Pt ed  [facility]' PROM   [facility] MAX          R sh' inf, AP mob             R sh' laser                          Neuro Re-Ed             RC iso flex/ext 5"x5 ea            RC iso er/ir             RC iso abd/add                                                                 Ther Ex             UBE  L only 3' L only 4'          pendulums             UT stretch             cspine AROM  x10 ea x10 ea          Elbow flex/ext  x20 x20 ea Wrist circles  x20 ea x20 ea          Bicep curl             Gripper squeeze   Red x20                                    Ther Activity                                       Gait Training                                       Modalities             TENS+MHP

## 2023-07-11 ENCOUNTER — OFFICE VISIT (OUTPATIENT)
Dept: PHYSICAL THERAPY | Facility: CLINIC | Age: 43
End: 2023-07-11
Payer: OTHER MISCELLANEOUS

## 2023-07-11 DIAGNOSIS — M25.511 ACUTE PAIN OF RIGHT SHOULDER: Primary | ICD-10-CM

## 2023-07-11 PROCEDURE — 97140 MANUAL THERAPY 1/> REGIONS: CPT | Performed by: PHYSICAL THERAPIST

## 2023-07-11 PROCEDURE — 97110 THERAPEUTIC EXERCISES: CPT | Performed by: PHYSICAL THERAPIST

## 2023-07-11 NOTE — PROGRESS NOTES
Daily Note     Today's date: 2023  Patient name: Evans Keenan  : 1980  MRN: [id number]  Referring provider: Esperanza Nguyen DO  Dx:   Encounter Diagnosis     ICD-10-CM    1. Acute pain of right shoulder  M25.511           Start Time: 1447          Subjective: Having a little more pain today. Having crepitus in the neck      Objective: See treatment diary below      Assessment: Slightly greater PROM prior to pain onset today in all planes. She does have increased tone to the R scalenes. Decreased cervical crepitus following scalene and UT STM. 1# weight added to wrist and elbow strengthening, which she did fair with. Also able to increase UBE by a minute. Tolerated treatment fair. Patient demonstrated fatigue post treatment and would benefit from continued PT      Plan: Continue per plan of care. Precautions: potential R shoulder adhesive capsulitis, CRPS  Other factors: h/o R shoulder surgeries/traumas      HEP:  Chronic pain and CRPS info handouts    Access Code: [email or web address]  URL: [email or web address]  Date: 2023  Prepared by: Jamila Jones    Exercises  - Seated Cervical Rotation AROM  - 10 reps  - Seated Cervical Extension AROM  - 10 reps  - Seated Cervical Sidebending AROM  - 10 reps  - Seated Elbow Flexion and Extension AROM  - 20 reps  - Wrist Circles AROM  - 10 reps      Manuals          Pt ed  [place] PROM  [address]         R sh' inf, AP mob             Cervical STM    MAX                      Neuro Re-Ed             RC iso flex/ext 5"x5 ea            RC iso er/ir             RC iso abd/add                                                                 Ther Ex             UBE  L only 3' L only 4' L only 5'         pendulums             UT stretch             cspine AROM  x10 ea x10 ea x10 ea         Elbow flex/ext  x20 x20 ea 1# x20         Wrist circles  x20 ea x20 ea 1# x10 ea         Gripper squeeze   Red x20 Red x20 Ther Activity                                       Gait Training                                       Modalities             TENS+MHP

## 2023-07-13 ENCOUNTER — OFFICE VISIT (OUTPATIENT)
Dept: PHYSICAL THERAPY | Facility: CLINIC | Age: 43
End: 2023-07-13
Payer: OTHER MISCELLANEOUS

## 2023-07-13 DIAGNOSIS — M25.511 ACUTE PAIN OF RIGHT SHOULDER: Primary | ICD-10-CM

## 2023-07-13 PROCEDURE — 97110 THERAPEUTIC EXERCISES: CPT | Performed by: PHYSICAL THERAPIST

## 2023-07-13 PROCEDURE — 97112 NEUROMUSCULAR REEDUCATION: CPT | Performed by: PHYSICAL THERAPIST

## 2023-07-13 PROCEDURE — 97140 MANUAL THERAPY 1/> REGIONS: CPT | Performed by: PHYSICAL THERAPIST

## 2023-07-13 NOTE — PROGRESS NOTES
Daily Note     Today's date: 2023  Patient name: Gama Callahan  : 1980  MRN: [id number]  Referring provider: Richi Estrella DO  Dx:   Encounter Diagnosis     ICD-10-CM    1. Acute pain of right shoulder  M25.511           Start Time: 1527          Subjective: Doing okay. Felt a little strange in the bicep after lv, but overall okay. Feels like in general she is making progress with therapy. Objective: See treatment diary below      Assessment: FOTO score improvement from 47 at IE to 54 today. She is making progress toward her FOTO score goal of 57/100. Slightly improved pain-free ROM today during shoulder PROM in to abd, flex and ER/IR. Increased gripper resistance today from 3# to 5#. Pt noted "feeling the gripper" in her hands and forearms end of session. She was able to increase UBE time to 6 mins. Overall she continues to make small gains in function and mobility. Tolerated treatment fair. Patient demonstrated fatigue post treatment and would benefit from continued PT      Plan: Continue per plan of care. Precautions: potential R shoulder adhesive capsulitis, CRPS  Other factors: h/o R shoulder surgeries/traumas      HEP:  Chronic pain and CRPS info handouts    Access Code: [email or web address]  URL: [email or web address]  Date: 2023  Prepared by: Martina Aguilar    Exercises  - Seated Cervical Rotation AROM  - 10 reps  - Seated Cervical Extension AROM  - 10 reps  - Seated Cervical Sidebending AROM  - 10 reps  - Seated Elbow Flexion and Extension AROM  - 20 reps  - Wrist Circles AROM  - 10 reps      Manuals         RE             FOTO      [facility]        Pt ed  [address] [facility]          R sh' PROM  [address] [facility]  [facility] MAX        R sh' inf, AP mob             Cervical STM    MAX                      Neuro Re-Ed             RC iso flex/ext 5"x5 ea            RC iso er/ir             RC iso abd/add                                                                 Ther Ex UBE  L only 3' L only 4' L only 5' L only 6'        pendulums             UT stretch             cspine AROM  x10 ea x10 ea x10 ea x10 ea        Elbow flex/ext  x20 x20 ea 1# x20 1# x20        Wrist circles  x20 ea x20 ea 1# x20 ea 1# x20 ea        Gripper squeeze   Red x20 Red x20 grn x20                                  Ther Activity                                       Gait Training                                       Modalities             TENS+MHP

## 2023-07-14 ENCOUNTER — TELEPHONE (OUTPATIENT)
Dept: OBGYN CLINIC | Facility: HOSPITAL | Age: 43
End: 2023-07-14

## 2023-07-14 NOTE — TELEPHONE ENCOUNTER
Caller: Ashtyn    Doctor: Christy Delarosa    Reason for call: Asked if patient had f/u appt?  Advised no    Call back#: na

## 2023-07-18 ENCOUNTER — OFFICE VISIT (OUTPATIENT)
Dept: PHYSICAL THERAPY | Facility: CLINIC | Age: 43
End: 2023-07-18
Payer: OTHER MISCELLANEOUS

## 2023-07-18 DIAGNOSIS — M25.511 ACUTE PAIN OF RIGHT SHOULDER: Primary | ICD-10-CM

## 2023-07-18 PROCEDURE — 97112 NEUROMUSCULAR REEDUCATION: CPT | Performed by: PHYSICAL THERAPIST

## 2023-07-18 PROCEDURE — 97140 MANUAL THERAPY 1/> REGIONS: CPT | Performed by: PHYSICAL THERAPIST

## 2023-07-18 PROCEDURE — 97110 THERAPEUTIC EXERCISES: CPT | Performed by: PHYSICAL THERAPIST

## 2023-07-18 NOTE — PROGRESS NOTES
Daily Note     Today's date: 2023  Patient name: Courtney Morse  : 1980  MRN: [id number]  Referring provider: Tomas Green DO  Dx:   Encounter Diagnosis     ICD-10-CM    1. Acute pain of right shoulder  M25.511                      Subjective: Feels "alright" today. Last night was a hard night with sleep and pain. Objective: See treatment diary below      Assessment: More fatigued today with UBE. R shoulder PROM flexion achieved about 75 deg today. She is making progress in ROM tolerance with each session. Increased bicep curl weight by another 1lb. She was appropriately challenged. Noted feeling pain end of session while performing gripper squeezes. Did not finish reps secondary to pain and fatigue. Tolerated treatment fair. Patient demonstrated fatigue post treatment and would benefit from continued PT      Plan: Continue per plan of care. Schedule more visits? Precautions: potential R shoulder adhesive capsulitis, CRPS  Other factors: h/o R shoulder surgeries/traumas      HEP:  Chronic pain and CRPS info handouts    Access Code: [email or web address]  URL: [email or web address]  Date: 2023  Prepared by: Edy Sandoval    Exercises  - Seated Cervical Rotation AROM  - 10 reps  - Seated Cervical Extension AROM  - 10 reps  - Seated Cervical Sidebending AROM  - 10 reps  - Seated Elbow Flexion and Extension AROM  - 20 reps  - Wrist Circles AROM  - 10 reps      Manuals  7       RE             FOTO     MAX        Pt ed  [address] [facility]          R sh' PROM  [address] [facility] [address] [facility]  [facility]       R sh' inf, AP mob             Cervical STM    MAX                      Neuro Re-Ed             RC iso flex/ext 5"x5 ea            RC iso er/ir             RC iso abd/add                                                                 Ther Ex             UBE  L only 3' L only 4' L only 5' L only 6' L only 6'       pendulums             UT stretch             cspine AROM  x10 ea x10 ea x10 ea x10 ea x10 ea       Elbow flex/ext  x20 x20 ea 1# x20 1# x20 2# x20       Wrist circles  x20 ea x20 ea 1# x20 ea 1# x20 ea 2# x20 ea       Gripper squeeze   Red x20 Red x20 grn x20 grn x5                                 Ther Activity                                       Gait Training                                       Modalities             TENS+MHP

## 2023-07-20 ENCOUNTER — APPOINTMENT (OUTPATIENT)
Dept: PHYSICAL THERAPY | Facility: CLINIC | Age: 43
End: 2023-07-20
Payer: OTHER MISCELLANEOUS

## 2023-07-21 ENCOUNTER — TELEPHONE (OUTPATIENT)
Dept: DERMATOLOGY | Facility: CLINIC | Age: 43
End: 2023-07-21

## 2023-07-21 NOTE — TELEPHONE ENCOUNTER
NP calling for apt (has referral for change of skin color and states other issues on chin/face area), informed fully booked until end of year, but will put on waitlist for possible cancellation and to cb in August about scheduling in 2024.

## 2023-07-25 ENCOUNTER — APPOINTMENT (OUTPATIENT)
Dept: PHYSICAL THERAPY | Facility: CLINIC | Age: 43
End: 2023-07-25
Payer: OTHER MISCELLANEOUS

## 2023-07-27 ENCOUNTER — APPOINTMENT (OUTPATIENT)
Dept: PHYSICAL THERAPY | Facility: CLINIC | Age: 43
End: 2023-07-27
Payer: OTHER MISCELLANEOUS

## 2023-08-03 ENCOUNTER — EVALUATION (OUTPATIENT)
Dept: PHYSICAL THERAPY | Facility: CLINIC | Age: 43
End: 2023-08-03
Payer: OTHER MISCELLANEOUS

## 2023-08-03 DIAGNOSIS — M25.511 ACUTE PAIN OF RIGHT SHOULDER: Primary | ICD-10-CM

## 2023-08-03 PROCEDURE — 97110 THERAPEUTIC EXERCISES: CPT | Performed by: PHYSICAL THERAPIST

## 2023-08-03 PROCEDURE — 97140 MANUAL THERAPY 1/> REGIONS: CPT | Performed by: PHYSICAL THERAPIST

## 2023-08-03 NOTE — PROGRESS NOTES
PT Evaluation     Today's date: 8/3/2023  Patient name: Andreia Benito  : 1980  MRN: [id number]  Referring provider: Nasra Beck DO  Dx:   Encounter Diagnosis     ICD-10-CM    1. Acute pain of right shoulder  M25.511           Start Time: 1657          Assessment  Assessment details: Andreia Benito is a 36 y.o. female presenting to outpatient physical therapy at [facility] with complaints of R shoulder pain.  They present with decreased range of motion, decreased strength, limited flexibility, poor postural awareness, poor body mechanics, poor balance, decreased tolerance to activity and decreased functional mobility due to Acute pain of right shoulder  (primary encounter diagnosis). Brooke Guadalupe would benefit from skilled physical therapy to address noted impairments in order to allow for full functional return to work and ADL-related activities. Thank you for the referral!    RE: 8/3/23  Andreia Benito has attended physical therapy for 7 visits. In this time, they have made significant improvements in symptoms and function, as noted by subjective report, improved balance, ROM, strength, flexibility and activity tolerance. They have made positive goal progress with FOTO score improvement from 47 at initial evaluation to 47 currently. They do continue to have impairments in pain, ROM, strength, balance, flexibility and activity tolerance. Recommend continued skilled PT in order to maximize function. Impairments: abnormal muscle firing, abnormal or restricted ROM, activity intolerance, impaired balance, impaired physical strength, lacks appropriate home exercise program, pain with function and poor body mechanics  Barriers to therapy: n/a  Understanding of Dx/Px/POC: excellent  Goals  ST.  Independent with HEP in 2 weeks - met  2. Decrease pain by 50% in 3 wks - not met  3.  Increase R shoulder flexion AROM to 150 degrees in 3 weeks - not met    LT.  Achieve FOTO score of 57/100 in 6 weeks - near met  2.  Able to achieve full R shoulder ext in 6 weeks - not met  3. Strength = 4/5 R shoulder flex in 6 weeks - not met      Plan  Plan details: RE in 6 weeks. Patient would benefit from: skilled physical therapy  Planned modality interventions: cryotherapy, electrical stimulation/Russian stimulation and thermotherapy: hydrocollator packs  Planned therapy interventions: abdominal trunk stabilization, manual therapy, neuromuscular re-education, therapeutic activities, therapeutic exercise, body mechanics training and home exercise program  Duration in weeks: 6  Plan of Care beginning date: 8/3/2023  Plan of Care expiration date: 9/15/2023  Treatment plan discussed with: patient        Subjective Evaluation    History of Present Illness  Mechanism of injury: Pt c/o right shoulder pain. She has a h/o biceps tenodesis in January 2019 from a biceps tear while exercising. By May 2019, she needed a manipulation under anesthesia secondary to frozen shoulder. She continued to have pain and eventually had a rotator cuff debridement in August 2020. She moved to the area in October of 2020 and began seeing a different pain doctor. This provided significant relief of symptoms with tramadol and meloxicam for 18 months. 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 She saw her pain doctor again and received a lidocaine injection. Seen by Dr. Rocky Collado in March 2023. She has had increased pigmentation to the anterior incision following this injury. She denies pain at that region. There was concern for internal infection, aspiration culture positive for Staphylococcus coagulase. MRI shows "intra substance supraspinatus degeneration, partial undersurface fraying of the distal sub scapularis. These are age indeterminate.  Superior bursitis."    Pain is located deep anterior, posterior and superior shoulder with occasional sharp throbbing to the biceps. Avg pain level rated 5/10. Agg with raising her arm, lifting a 1/2 gallon milk, reaching. Eased with rest while arm is supported, taking meloxicam and tramadol, pain patches, heat. Denies RUE N/T, neck pain, fever. Positive for R shoulder weakness. Pt is R hand dominant. She is not currently working. She lives at home with her family, has 3 older children. Goals for therapy is to get stronger, not have to take as much medication. RE: 8/3/23  Pt has had a second opinion with orthopedics at [facility]. She has been dx with impingement; held on steroid injection due to discoloration of the skin on the R shoulder, concern for systemic infection. Sent for labs, which came back abnormal for the sedimentation rate. Today she got blood work to r/o RA or other autoimmune disorders; results pending. If these are NL, will likely plan to move fwd with the injection. F/u with rheumatology is in October. She also notes a recent breakout of an unknown rash, which caused her to cx past few PT appointments out of concern for staph infection. Dermatology testing for staph infection is negative. She was given dx of "immune infection" for the rash and started steroid medication yesterday. Overall she notes ROM has improved in the shoulder since starting therapy, though her pain has not changed. Avg pain level is 5-6/10. She is unable to lift or use the R shoulder for most ADLs. She continues to take meloxicam and tramadol and as mentioned is on the prednisone taper for 2 weeks. She was encouraged to continue therapy by her orthopedic dr.    She is hoping to return back to work as a nurse ASAP.           Patient Goals  Patient goals for therapy: decreased edema, decreased pain, improved balance, increased motion, return to work, return to [employer] activities, independence with ADLs/IADLs and increased strength          Objective     Postural Observations    Additional Postural Observation Details  Mild rounded shoulders bilat; incisions with appropriate healing; anterior incision with 0.5 in hyperpigmentation - no tenderness to touch    Tenderness     Additional Tenderness Details  TTP R pec, biceps belly, biceps tendon, anterior joint line, posterior joint line, supraspin, infra, teres, lat, triceps    (-) painful arc  (-) sulcus    Cervical/Thoracic Screen   Cervical range of motion within normal limits    Neurological Testing     Sensation     Shoulder   Left Shoulder   Intact: light touch    Right Shoulder   Intact: light touch    Active Range of Motion     Right Shoulder   Flexion: 120 degrees   Extension: 25 degrees   Abduction: 69 degrees   External rotation 0°: 60 degrees     Strength/Myotome Testing     Left Shoulder     Planes of Motion   Flexion: 5   Extension: 5   External rotation at 0°: 5   Internal rotation at 0°: 5     Isolated Muscles   Biceps: 5   Triceps: 5   Upper trapezius: 5     Right Shoulder     Planes of Motion   Flexion: 4-   Extension: 4   Abduction: 4   External rotation at 0°: 4-   Internal rotation at 0°: 4     Isolated Muscles   Biceps: 4   Triceps: 4   Upper trapezius: 5              Precautions: potential R shoulder adhesive capsulitis, CRPS  Other factors: h/o R shoulder surgeries/traumas      HEP:  Chronic pain and CRPS info handouts    Access Code: [email or web address]  URL: [email or web address]  Date: 07/06/2023  Prepared by: Mao Acosta    Exercises  - Seated Cervical Rotation AROM  - 10 reps  - Seated Cervical Extension AROM  - 10 reps  - Seated Cervical Sidebending AROM  - 10 reps  - Seated Elbow Flexion and Extension AROM  - 20 reps  - Wrist Circles AROM  - 10 reps      Manuals 6/22 7/5 7/6 7/11 7/13 7/18 8/3      RE       [address] [place] [address]  [address] [place] [id number] [address]      R sh' inf, AP mob             Cervical STM    MAX         Biceps STM       [address] [facility]                                             Neuro Re-Ed             RC iso flex/ext 5"x5 ea            RC iso er/ir             RC iso abd/add                                                                 Ther Ex             UBE  L only 3' L only 4' L only 5' L only 6' L only 6'       pendulums             UT stretch             cspine AROM  x10 ea x10 ea x10 ea x10 ea x10 ea       Elbow flex/ext  x20 x20 ea 1# x20 1# x20 2# x20 1# x20      Wrist circles  x20 ea x20 ea 1# x20 ea 1# x20 ea 2# x20 ea       Gripper squeeze   Red x20 Red x20 grn x20 grn x5                                 Ther Activity                                       Gait Training                                       Modalities             TENS+MHP

## 2023-08-07 ENCOUNTER — OFFICE VISIT (OUTPATIENT)
Dept: PHYSICAL THERAPY | Facility: CLINIC | Age: 43
End: 2023-08-07
Payer: OTHER MISCELLANEOUS

## 2023-08-07 DIAGNOSIS — M25.511 ACUTE PAIN OF RIGHT SHOULDER: Primary | ICD-10-CM

## 2023-08-07 PROCEDURE — 97112 NEUROMUSCULAR REEDUCATION: CPT | Performed by: PHYSICAL THERAPIST

## 2023-08-07 PROCEDURE — 97140 MANUAL THERAPY 1/> REGIONS: CPT | Performed by: PHYSICAL THERAPIST

## 2023-08-07 PROCEDURE — 97110 THERAPEUTIC EXERCISES: CPT | Performed by: PHYSICAL THERAPIST

## 2023-08-07 NOTE — PROGRESS NOTES
Daily Note     Today's date: 2023  Patient name: Cheryl Zhou  : 1980  MRN: [id number]  Referring provider: Delma De La Cruz DO  Dx:   Encounter Diagnosis     ICD-10-CM    1. Acute pain of right shoulder  M25.511           Start Time: 1743          Subjective: Feels like the prednisone taper she is taking for the rash on her face is slightly improving her shoulder pain the past few days. Objective: See treatment diary below      Assessment: Slightly improved PROM of the R shoulder today, likely secondary to pain relief noted with taking the steroid pack. Tolerated treatment well. Patient demonstrated fatigue post treatment and would benefit from continued PT      Plan: Continue per plan of care. Precautions: potential R shoulder adhesive capsulitis, CRPS  Other factors: h/o R shoulder surgeries/traumas      HEP:  Chronic pain and CRPS info handouts    Access Code: [email or web address]  URL: [email or web address]  Date: 2023  Prepared by: Jairo Barron    Exercises  - Seated Cervical Rotation AROM  - 10 reps  - Seated Cervical Extension AROM  - 10 reps  - Seated Cervical Sidebending AROM  - 10 reps  - Seated Elbow Flexion and Extension AROM  - 20 reps  - Wrist Circles AROM  - 10 reps      Manuals 6/22 7/5 7/6 7/11 7/13 7/18 8/3 8/7     RE       [address] [facility]          R sh' PROM  [address] MAX     R sh' inf, AP mob             Cervical STM    MAX         Biceps STM       MAX                                             Neuro Re-Ed             RC iso flex/ext 5"x5 ea            RC iso er/ir             RC iso abd/add                                                                 Ther Ex             UBE  L only 3' L only 4' L only 5' L only 6' L only 6'  L AROM, R AAROM 4'     pendulums             UT stretch             cspine AROM  x10 ea x10 ea x10 ea x10 ea x10 ea  x10 ea     Elbow flex/ext  x20 x20 ea 1# x20 1# x20 2# x20 1# x20 1# x20 Wrist circles  x20 ea x20 ea 1# x20 ea 1# x20 ea 2# x20 ea  1# x20 ea     Gripper squeeze   Red x20 Red x20 grn x20 grn x5  grn x20                               Ther Activity                                       Gait Training                                       Modalities             TENS+MHP

## 2023-08-21 ENCOUNTER — OFFICE VISIT (OUTPATIENT)
Dept: PHYSICAL THERAPY | Facility: CLINIC | Age: 43
End: 2023-08-21
Payer: OTHER MISCELLANEOUS

## 2023-08-21 DIAGNOSIS — M25.511 ACUTE PAIN OF RIGHT SHOULDER: Primary | ICD-10-CM

## 2023-08-21 PROCEDURE — 97140 MANUAL THERAPY 1/> REGIONS: CPT | Performed by: PHYSICAL THERAPIST

## 2023-08-21 PROCEDURE — 97110 THERAPEUTIC EXERCISES: CPT | Performed by: PHYSICAL THERAPIST

## 2023-08-21 PROCEDURE — 97112 NEUROMUSCULAR REEDUCATION: CPT | Performed by: PHYSICAL THERAPIST

## 2023-08-21 NOTE — PROGRESS NOTES
Daily Note     Today's date: 2023  Patient name: Linda Ansari  : 1980  MRN: [id number]  Referring provider: Rosalee Ceja DO  Dx:   Encounter Diagnosis     ICD-10-CM    1. Acute pain of right shoulder  M25.511           Start Time:           Subjective: More pain today than usual. Took a tramadol prior to session. Starting work again this Saturday, 2 days per week. F/u with ortho is beginning of September. Objective: See treatment diary below      Assessment: She continues to improve slowly with small range and gentle shoulder PROM. Able to achieve 90 deg sh' flexion PROM and 45 deg abd today. Initiated very light band shoulder strengthening as she is returning to work and will benefit from the muscle activation. She was appropriately challenged at this level and required cues throughout to reduce shrugging. Band HEP provided. Tolerated treatment well. Patient demonstrated fatigue post treatment and would benefit from continued PT      Plan: Continue per plan of care. Precautions: potential R shoulder adhesive capsulitis, CRPS  Other factors: h/o R shoulder surgeries/traumas      HEP:  Access Code: [email or web address]  URL: [email or web address]  Date: 2023  Prepared by: Yamila Scott    Exercises  - Shoulder Blade Squeeze  - 20 reps - 5 sec hold  - Shoulder extension with resistance  - 20 reps  - Row with resistance  - 20 reps  - Chest Press with Resistance  - 20 reps    Manuals  8/3 8/7 8/21    JOSE Chen        Pt ed  [address]          R sh' PROM  [address]    R sh' inf, AP mob             Cervical STM    MAX         Biceps STM       MAX                                             Neuro Re-Ed             RC iso flex/ext 5"x5 ea            RC iso er/ir             RC iso abd/add                                                                 Ther Ex             UBE  L only 3' L only 4' L only 5' L only 6' L only 6'  L AROM, R AAROM 4'     scap rtrxn         5"x20    UT stretch             cspine AROM  x10 ea x10 ea x10 ea x10 ea x10 ea  x10 ea     Elbow flex/ext  x20 x20 ea 1# x20 1# x20 2# x20 1# x20 1# x20 1# 2x10  2# x10    Wrist circles  x20 ea x20 ea 1# x20 ea 1# x20 ea 2# x20 ea  1# x20 ea     Gripper squeeze   Red x20 Red x20 grn x20 grn x5  grn x20     Sh' ext         ytb x20    row         ytb x20    tband chest press         ytb x20                              Ther Activity                                       Gait Training                                       Modalities             TENS+MHP

## 2023-08-31 ENCOUNTER — OFFICE VISIT (OUTPATIENT)
Dept: PHYSICAL THERAPY | Facility: CLINIC | Age: 43
End: 2023-08-31
Payer: OTHER MISCELLANEOUS

## 2023-08-31 DIAGNOSIS — M25.511 ACUTE PAIN OF RIGHT SHOULDER: Primary | ICD-10-CM

## 2023-08-31 PROCEDURE — 97110 THERAPEUTIC EXERCISES: CPT

## 2023-08-31 PROCEDURE — 97140 MANUAL THERAPY 1/> REGIONS: CPT

## 2023-08-31 NOTE — PROGRESS NOTES
Daily Note     Today's date: 2023  Patient name: Woody Lewis  : 1980  MRN: [id number]  Referring provider: Solitario Nascimento DO  Dx:   Encounter Diagnosis     ICD-10-CM    1. Acute pain of right shoulder  M25.511                      Subjective: Pt reports no change in symptoms since LV    Objective: See treatment diary below      Assessment:  Tolerated treatment poor. Pt still having high pain levels and  Guarding and resistance to manual stretching. Trialed MHP for 10' prior to treatment which she feels she benefited from. About same end range as LV of 90 degrees of Flx and 45 degrees of ABD. Soft tissue work was beneficial to improve her fwd flexed posture and muscle tightness but increased her pain levels. STM also assisted in slightly improving her overall ROM. Patient demonstrated fatigue post treatment and would benefit from continued PT      Plan: Continue per plan of care. Precautions: potential R shoulder adhesive capsulitis, CRPS  Other factors: h/o R shoulder surgeries/traumas      HEP:  Access Code: [email or web address]  URL: [email or web address]  Date: 2023  Prepared by: Mya Cannon    Exercises  - Shoulder Blade Squeeze  - 20 reps - 5 sec hold  - Shoulder extension with resistance  - 20 reps  - Row with resistance  - 20 reps  - Chest Press with Resistance  - 20 reps    Manuals 6/22 7/5 7/6 7/11 7/13 7/18 8/3 8/7 8/21 8/31   RE       [facility]        Pt ed  [address]          R sh' PROM  [address] [facility]  [facility]   R sh' inf, AP mob             Cervical STM    MAX         Biceps STM       MAX                                             Neuro Re-Ed             RC iso flex/ext 5"x5 ea            RC iso er/ir             RC iso abd/add                                                                 Ther Ex             UBE  L only 3' L only 4' L only 5' L only 6' L only 6'  L AROM, R AAROM 4'     scap rtrxn         5"x20 5"x20   UT stretch             cspine AROM  x10 ea x10 ea x10 ea x10 ea x10 ea  x10 ea     Elbow flex/ext  x20 x20 ea 1# x20 1# x20 2# x20 1# x20 1# x20 1# 2x10  2# x10 2#  x20   Wrist circles  x20 ea x20 ea 1# x20 ea 1# x20 ea 2# x20 ea  1# x20 ea  2#  x20 ea   Gripper squeeze   Red x20 Red x20 grn x20 grn x5  grn x20     Sh' ext         ytb x20    row         ytb x20    tband chest press         ytb x20                              Ther Activity                                       Gait Training                                       Modalities             TENS+MHP